# Patient Record
Sex: MALE | Race: WHITE | NOT HISPANIC OR LATINO | Employment: UNEMPLOYED | URBAN - METROPOLITAN AREA
[De-identification: names, ages, dates, MRNs, and addresses within clinical notes are randomized per-mention and may not be internally consistent; named-entity substitution may affect disease eponyms.]

---

## 2020-03-23 ENCOUNTER — OFFICE VISIT (OUTPATIENT)
Dept: GASTROENTEROLOGY | Facility: CLINIC | Age: 60
End: 2020-03-23
Payer: COMMERCIAL

## 2020-03-23 ENCOUNTER — PREP FOR PROCEDURE (OUTPATIENT)
Dept: GASTROENTEROLOGY | Facility: CLINIC | Age: 60
End: 2020-03-23

## 2020-03-23 VITALS
WEIGHT: 194.6 LBS | HEART RATE: 105 BPM | BODY MASS INDEX: 31.27 KG/M2 | DIASTOLIC BLOOD PRESSURE: 90 MMHG | HEIGHT: 66 IN | SYSTOLIC BLOOD PRESSURE: 158 MMHG

## 2020-03-23 DIAGNOSIS — R19.5 POSITIVE COLORECTAL CANCER SCREENING USING COLOGUARD TEST: Primary | ICD-10-CM

## 2020-03-23 PROCEDURE — 99203 OFFICE O/P NEW LOW 30 MIN: CPT | Performed by: PHYSICIAN ASSISTANT

## 2020-03-23 RX ORDER — GUAIFENESIN 600 MG
1200 TABLET, EXTENDED RELEASE 12 HR ORAL EVERY 12 HOURS SCHEDULED
COMMUNITY

## 2020-03-23 RX ORDER — TERAZOSIN 5 MG/1
CAPSULE ORAL
COMMUNITY
Start: 2020-01-15

## 2020-03-23 RX ORDER — QUINAPRIL 40 MG/1
TABLET ORAL
COMMUNITY
Start: 2020-01-15

## 2020-03-23 RX ORDER — PAROXETINE HYDROCHLORIDE 20 MG/1
TABLET, FILM COATED ORAL
COMMUNITY
Start: 2020-01-15

## 2020-03-23 NOTE — PROGRESS NOTES
[facility] - Outpatient Consultation  June Heady 64 y o  male MRN: [id number]  Encounter: [id number]          ASSESSMENT AND PLAN:      1  Colon Cancer Screening  2  Positive Cologuard  - Will schedule for colonoscopy with miralax prep due to positive cologuard and no prior colonoscopy  - He is not having any alarm symptoms currently and there is no family history of colon cancer  - Discussed risks/benefits of colonoscopy extensively due to patient's anxiety, all questions were answered  ______________________________________________________________________    HPI:  Mr Quin Gonzalez is a 63 yo M with a PMH of HTN, anxiety, presenting due to a positive cologuard test with his PCP  He reports normal bowel habits with 1 healthy BM daily, has occasional constipation but this is not often and has been present intermittently for most of his life  He denies any symptoms such as abdominal pain, blood in his stools, change in bowel habits, or weight loss  Some of his siblings have had precancerous polyps but there is no family history of colon cancer  He has never had a colonoscopy before and he is very anxious about having one due to the anesthesia as he has never had an surgeries or procedures in the past       REVIEW OF SYSTEMS: Negative except for as stated above       Historical Information   Past Medical History:   Diagnosis Date    Hypertension      History reviewed  No pertinent surgical history    Social History   Social History     Substance and Sexual Activity   Alcohol Use Never    Frequency: Never     Social History     Substance and Sexual Activity   Drug Use Never     Social History     Tobacco Use   Smoking Status Current Every Day Smoker    Packs/day: 0 25    Types: Cigarettes   Smokeless Tobacco Never Used     Family History   Problem Relation Age of Onset    Heart disease Mother     Parkinsonism Father        Meds/Allergies       Current Outpatient Medications:    guaiFENesin (MUCINEX) 600 mg 12 hr tablet    PARoxetine (PAXIL) 20 mg tablet    quinapril (ACCUPRIL) 40 MG tablet    terazosin (HYTRIN) 5 mg capsule    Allergies   Allergen Reactions    Penicillins Hives           Objective     Blood pressure 158/90, pulse 105, height 5' 6" (1 676 m), weight 88 3 kg (194 lb 9 6 oz)  Body mass index is 31 41 kg/m²  PHYSICAL EXAM:      General Appearance:   Alert, cooperative, no distress   HEENT:   Normocephalic, atraumatic, anicteric      Neck:  Supple, symmetrical, trachea midline   Lungs:   Clear to auscultation bilaterally; no rales, rhonchi or wheezing; respirations unlabored    Heart[de-identified]   Regular rate and rhythm; no murmur, rub, or gallop  Abdomen:   Soft, non-tender, non-distended; normal bowel sounds; no masses, no organomegaly    Rectal:   Deferred    Extremities:  No cyanosis, clubbing or edema    Pulses:  2+ and symmetric    Skin:  No jaundice, rashes, or lesions    Lymph nodes:  No palpable cervical lymphadenopathy        Lab Results:   No visits with results within 1 Day(s) from this visit  Latest known visit with results is:   No results found for any previous visit  Radiology Results:   No results found

## 2020-03-24 ENCOUNTER — TELEPHONE (OUTPATIENT)
Dept: GASTROENTEROLOGY | Facility: CLINIC | Age: 60
End: 2020-03-24

## 2020-03-27 ENCOUNTER — TELEPHONE (OUTPATIENT)
Dept: GASTROENTEROLOGY | Facility: CLINIC | Age: 60
End: 2020-03-27

## 2020-03-27 NOTE — TELEPHONE ENCOUNTER
Received an e-mail from pre encounter that  [facility] is not par with patients insurance, I called the patient to inform him he would have to pay an out of network cost or he could call the back of his card and find out where he could go that is in par, he chose to cx colonoscopy for 4/1 and call his card and go elsewhere